# Patient Record
Sex: FEMALE | Race: WHITE | NOT HISPANIC OR LATINO | URBAN - METROPOLITAN AREA
[De-identification: names, ages, dates, MRNs, and addresses within clinical notes are randomized per-mention and may not be internally consistent; named-entity substitution may affect disease eponyms.]

---

## 2017-11-10 ENCOUNTER — TELEPHONE (OUTPATIENT)
Dept: TRANSPLANT | Facility: CLINIC | Age: 47
End: 2017-11-10

## 2017-11-10 NOTE — TELEPHONE ENCOUNTER
"[facility]    [id number]     LIVING LIVER DONOR EVALUATION  Donor First Name Ramya Donor MRN    Donor Middle Name Alma Completed 2017 2:17 PM  Donor Last Name Jayden Record Id [id number]   1979      BREEZE Screen PASSED      Intended Recipient  Recipient First Name Nuno Relationship Aunt or Uncle  Recipient Last Name Tabby Recipient Diagnosis    Recipient    Recipient Insurance Provider    Recipient MRN   Recipient Insurance Type    Recipient Height        Recipient Weight        Recipient's ABO        Donor Information  Age 38 Race   Height 5' 10'' Ethnicity Not /  Weight 180 Preferred Language English  BMI 25.8  Required No  Gender Female Blood Type A  Demographics  Home Address [address] [place] [address] # [phone]  [facility] Type [facility] #    [facility] Code [id number] Type    Country [place] Preferred Contact day Mon, Fri, Thur, Wed, Tue  Email [email or web address] Preferred Contact time 11:00 AM-1:00 PM, 1:00 PM-4:00 PM, 09:00 AM-11:00 AM  Donor's Medical Information  Medical History Allergic Rhinitis   Breast Cancer dx  s/p XRT no Chemo   Herniated Disc (Cervical)   Hypertension   Hypertriglyceridemia   other (\"Hot flashes\")   other (\"cycts\") Medications Claritin   Fenofibrate   Losartan and Hydrochlorothiazide   Minocycline   Venlafaxine   atrovastatin  Surgical History ACL Repair, Left   ACL Repair, Right   Hysterectomy   Repair Torn Meniscus, Right   Repair Wrist Fracture, Right, NOS Allergies NKDA  Social History EtOH: Occasional (1-2 drinks/week)   History of EtOH abuse   Illicit Drug Use: Denies   Tobacco: Remote; Quit ; (1/2 ppd x 20 years) Self-Reported Functional Status \"I am able to participate in strenuous sports such as swimming, singles tennis, football, basketball, or skiing\"  Family Medical History Cancer (Aunt or Uncle)   Clots or Clotting Disease (denies)   Diabetes (Mother, Aunt or Uncle, Cousin) "   Heart Disease (Mother)   Hypertension (Mother, Aunt or Uncle)   Inflammatory Bowel Disease (denies)   Liver Disease (Aunt or Uncle)   Mental Illness (denies) Exercise Frequency Exercise (>3X per week)  Review of Organ Systems  Review of Systems Airway or Lungs: No   Blood Disorder: No   Cancer: Yes   Diabetes,Thyroid,Adrenal,Endocrine Disorder: No   Digestive or Liver: No   Female Health: No   Heart or Circulatory System: No   Immune Diseases: No   Kidneys and Bladder: No   Muscles,Bones,Joints: Yes   Neuro: No   Psych: No  Donor's Social Information  Medical Insurance Status Has medical insurance Level of 's or technical degree complete  Marital Status Domestic Partnership Employment Status Full Time  Living Accommodation Owns own home/apartment Employer [employer]  Living Arrangement With spouse Occupation    High Risk Behaviors Blood transfusion < 12 months. (NO)   Commercial sex < 12 months. (NO)   Illicit IV drug use < 5yrs. (NO)   Other high risk sexual contact < 12 months. (NO)      Reason for Donation  Referral Tx Candidate Reason for Donation I would like to donate to my Uncle Luther who is otherwise healthy 62 year old amazing man!  Donor Motivation Ready to start evaluation with reservations Patient Comments    PCP Contact  PCP Name    PCP [facility]    PCP State    PCP Phone    Emergency Contact  First Name Damion First Name Hellen  Last Name Jaylin Last Name Tabby  Phone # [phone] Phone # [phone]  Relationship Friend or Other Relationship Mother  Office Use

## 2017-11-10 NOTE — TELEPHONE ENCOUNTER
Hx (L)Breast CA.Per Dr Maria he wants us to discuss with surgeons if OK-will need records.Is on 2 meds for cholesterol which dr Maria states is OK. Also is on 2 BP meds and he's also OK with that.Pkt sent with GLORIA.

## 2017-11-13 ENCOUNTER — TELEPHONE (OUTPATIENT)
Dept: TRANSPLANT | Facility: CLINIC | Age: 47
End: 2017-11-13

## 2017-11-13 NOTE — TELEPHONE ENCOUNTER
At donor mtg, Dr Thayer suggested we ask Dr Dimas Mcnair(Oncologist) to tell us what this donor's risks are of recurrence of Breast CA. Now LM to Ramya asking her to fill out GLORIA ASAP so we can get records for Dr Mcnair.

## 2017-12-11 ENCOUNTER — MEDICAL CORRESPONDENCE (OUTPATIENT)
Dept: TRANSPLANT | Facility: CLINIC | Age: 47
End: 2017-12-11

## 2017-12-13 ENCOUNTER — MEDICAL CORRESPONDENCE (OUTPATIENT)
Dept: TRANSPLANT | Facility: CLINIC | Age: 47
End: 2017-12-13

## 2017-12-15 ENCOUNTER — MEDICAL CORRESPONDENCE (OUTPATIENT)
Dept: TRANSPLANT | Facility: CLINIC | Age: 47
End: 2017-12-15

## 2018-02-22 ENCOUNTER — DOCUMENTATION ONLY (OUTPATIENT)
Dept: TRANSPLANT | Facility: CLINIC | Age: 48
End: 2018-02-22

## 2018-02-22 NOTE — PROGRESS NOTES
Sent Inedu[employer] message to Dr Mcnair asking him to refer to Ramya's records in Epic re:Breast CA and Hyst for Cervical CA both in 2012Asked him what risks are of reoccuring CA.

## 2018-03-05 ENCOUNTER — TELEPHONE (OUTPATIENT)
Dept: TRANSPLANT | Facility: CLINIC | Age: 48
End: 2018-03-05

## 2018-03-05 NOTE — TELEPHONE ENCOUNTER
Reviewed with Dr Bloom,Dr Thayer and donor team the records from Genetic Counsellor at time of Breast CA. Notes mention she is at higher risk of greg CA in general all organs. Decieded she not be a donor. Now spoke with Ramya and understands.

## 2018-03-08 ENCOUNTER — TELEPHONE (OUTPATIENT)
Dept: TRANSPLANT | Facility: CLINIC | Age: 48
End: 2018-03-08